# Patient Record
Sex: FEMALE | Race: WHITE | NOT HISPANIC OR LATINO | Employment: UNEMPLOYED | URBAN - METROPOLITAN AREA
[De-identification: names, ages, dates, MRNs, and addresses within clinical notes are randomized per-mention and may not be internally consistent; named-entity substitution may affect disease eponyms.]

---

## 2017-01-10 ENCOUNTER — THERAPY VISIT (OUTPATIENT)
Dept: PHYSICAL THERAPY | Facility: CLINIC | Age: 52
End: 2017-01-10
Payer: COMMERCIAL

## 2017-01-10 DIAGNOSIS — M53.3 SACROILIAC JOINT PAIN: ICD-10-CM

## 2017-01-10 DIAGNOSIS — M54.50 LOW BACK PAIN WITHOUT SCIATICA, UNSPECIFIED BACK PAIN LATERALITY, UNSPECIFIED CHRONICITY: Primary | ICD-10-CM

## 2017-01-10 PROCEDURE — 97161 PT EVAL LOW COMPLEX 20 MIN: CPT | Mod: GP | Performed by: PHYSICAL THERAPIST

## 2017-01-10 PROCEDURE — 97110 THERAPEUTIC EXERCISES: CPT | Mod: GP | Performed by: PHYSICAL THERAPIST

## 2017-01-10 PROCEDURE — 97112 NEUROMUSCULAR REEDUCATION: CPT | Mod: GP | Performed by: PHYSICAL THERAPIST

## 2017-01-10 NOTE — PROGRESS NOTES
Subjective:    Silvia Brewster is a 51 year old female with a sacroiliac condition.  Condition occurred with:  Insidious onset.  Condition occurred: for unknown reasons.  This is a new condition  Date of orders 1/10/17    Pain started a couple of months ago; earlier this Summer went for a walk with her dog and developed LBP. Tried PT at [facility] which exacerbated sx.    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 Prolonged sitting/standing/computer work/driving.    PMH: kidney stones, diverticulosis, . .    Patient reports pain:  SI joint left, SI joint right and lower lumbar spine.  Radiates to:  Gluteals right and gluteals left.  Pain is described as aching and is intermittent and reported as 6/10.   Pain is worse during the day.  Symptoms are exacerbated by sitting and relieved by activity/movement and NSAID's (walking, movement. NSAIDs helpful).  Since onset symptoms are gradually worsening.  Special testing: has had imaging; no results in records.      General health as reported by patient is good.                                            Objective:    Standing Alignment:        Lumbar:  Anterior pelvic tilt and lordosis decr                           Lumbar/SI Evaluation  ROM:    AROM Lumbar:   Flexion:          Mod loss  Ext:                    Major loss   Side Bend:        Left:  Inc R side pain    Right:  Inc R side pain  Rotation:           Left:     Right:   Side Glide:        Left:     Right:         Strength: 4+/5 B glute max; + active SLR for poor TrA strength            Lumbar Palpation:    Tenderness present at Left:    Piriformis  Tenderness not present at Left:    PSIS  Tenderness present at Right: Piriformis  Tenderness not present at Right:  PSIS    Lumbar Provocation:      Left negative with:  PROM hip    Right negative with:  PROM hip    SI joint/Sacrum:        Left positive at:    Sacral thrust  Left negative at:    Squish and Ilium  Ventromedial  Right positive at:    Thigh thrust  Right negative at:  Squish and Ilium Ventromedial                                                     Sixto Lumbar Evaluation    Posture:  Sitting: fair  Standing: fair  Lordosis: Reduced  Lateral Shift: no      Movement Loss:  Flexion (Flex): mod  Extension (EXT): major      Test Movements:  FIS: During: produces  After: no effect      EIS: During: produces  After: no effect    Repeat EIS: During: produces  After: better  Mechanical Response: IncROM    EIL: During: increases  After: better  Mechanical Response: IncROM  Repeat EIL: During: produces  After: better                                                   ROS    Assessment/Plan:      Patient is a 51 year old female with lumbar and sacral complaints.    Patient has the following significant findings with corresponding treatment plan.                Diagnosis 1:  Sacroiliac joint pain with sx consistent with lumbar spine derangement  Pain -  hot/cold therapy, manual therapy, self management, education, directional preference exercise and home program  Decreased ROM/flexibility - manual therapy, therapeutic exercise and home program  Decreased joint mobility - manual therapy, therapeutic exercise and home program  Decreased strength - therapeutic exercise, therapeutic activities and home program  Decreased proprioception - neuro re-education, therapeutic activities and home program  Impaired posture - neuro re-education, therapeutic activities and home program    Therapy Evaluation Codes:   1) History comprised of:   Personal factors that impact the plan of care:      Gender, Past/current experiences and Time since onset of symptoms.    Comorbidity factors that impact the plan of care are:      None.     Medications impacting care: None.  2) Examination of Body Systems comprised of:   Body structures and functions that impact the plan of care:      Lumbar spine and Sacral illiac joint.   Activity limitations  that impact the plan of care are:      Bathing, Bending, Driving, Lifting, Reading/Computer work, Sitting and Standing.  3) Clinical presentation characteristics are:   Evolving/Changing.  4) Decision-Making    Low complexity using standardized patient assessment instrument and/or measureable assessment of functional outcome.  Cumulative Therapy Evaluation is: Low complexity.    Previous and current functional limitations:  (See Goal Flow Sheet for this information)    Short term and Long term goals: (See Goal Flow Sheet for this information)     Communication ability:  Patient appears to be able to clearly communicate and understand verbal and written communication and follow directions correctly.  Treatment Explanation - The following has been discussed with the patient:   RX ordered/plan of care  Anticipated outcomes  Possible risks and side effects  This patient would benefit from PT intervention to resume normal activities.   Rehab potential is good.    Frequency:  2 X week, once daily  Duration:  for 2 weeks tapering to 1 X a week over 4 weeks  Discharge Plan:  Achieve all LTG.  Independent in home treatment program.  Return to previous functional level by discharge.  Reach maximal therapeutic benefit.    Please refer to the daily flowsheet for treatment today, total treatment time and time spent performing 1:1 timed codes.

## 2017-01-10 NOTE — LETTER
[facility] PHYSICAL THERAPY  [address] [address]  [phone]    2017    Re: Silvia Brewster   :   1965  MRN:  [id number]   REFERRING PHYSICIAN:   Kevyn Gonzalez    [facility] PHYSICAL [facility]    Date of Initial Evaluation:  01/10/2017  Visits: 1  Rxs Used: 1  Reason for Referral:     Low back pain without sciatica, unspecified back pain laterality, unspecified chronicity  Sacroiliac joint pain    EVALUATION SUMMARY    Subjective:   Silvia Brewster is a 51 year old female with a sacroiliac condition.  Condition occurred with:  Insidious onset.  Condition occurred: for unknown reasons.  This is a new condition  Date of orders 1/10/17  Pain started a couple of months ago; earlier this Summer went for a walk with her dog and developed LBP. Tried PT at [facility] which exacerbated sx.  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 Prolonged sitting/standing/computer work/driving.  PMH: kidney stones, diverticulosis, . .    Patient reports pain:  SI joint left, SI joint right and lower lumbar spine.  Radiates to:  Gluteals right and gluteals left.  Pain is described as aching and is intermittent and reported as 6/10.   Pain is worse during the day.  Symptoms are exacerbated by sitting and relieved by activity/movement and NSAID's (walking, movement. NSAIDs helpful).  Since onset symptoms are gradually worsening.  Special testing: has had imaging; no results in records.      General health as reported by patient is good.                  Objective:  Standing Alignment:    Lumbar:  Anterior pelvic tilt and lordosis decr   Lumbar/SI Evaluation  ROM:    AROM Lumbar:   Flexion:          Mod loss  Ext:                    Major loss   Side Bend:        Left:  Inc R side pain    Right:  Inc R side pain  Rotation:           Left:     Right:   Side Glide:         Left:     Right:      Strength: 4+/5 B glute max; + active SLR for poor TrA strength  Re: Silvia Brewster   :   1965    Lumbar Palpation:    Tenderness present at Left:    Piriformis  Tenderness not present at Left:    PSIS  Tenderness present at Right: Piriformis  Tenderness not present at Right:  PSIS  Lumbar Provocation:    Left negative with:  PROM hip  Right negative with:  PROM hip  SI joint/Sacrum:      Left positive at:    Sacral thrust  Left negative at:    Squish and Ilium Ventromedial  Right positive at:    Thigh thrust  Right negative at:  Squish and Ilium Ventromedial  Sixto Lumbar Evaluation  Posture:  Sitting: fair  Standing: fair  Lordosis: Reduced  Lateral Shift: no  Movement Loss:  Flexion (Flex): mod  Extension (EXT): major  Test Movements:  FIS: During: produces  After: no effect    EIS: During: produces  After: no effect    Repeat EIS: During: produces  After: better  Mechanical Response: IncROM  EIL: During: increases  After: better  Mechanical Response: IncROM  Repeat EIL: During: produces  After: better      Assessment/Plan:    Patient is a 51 year old female with lumbar and sacral complaints.    Patient has the following significant findings with corresponding treatment plan.                Diagnosis 1:  Sacroiliac joint pain with sx consistent with lumbar spine derangement  Pain -  hot/cold therapy, manual therapy, self management, education, directional preference exercise and home program  Decreased ROM/flexibility - manual therapy, therapeutic exercise and home program  Decreased joint mobility - manual therapy, therapeutic exercise and home program  Decreased strength - therapeutic exercise, therapeutic activities and home program  Decreased proprioception - neuro re-education, therapeutic activities and home program  Impaired posture - neuro re-education, therapeutic activities and home program    Therapy Evaluation Codes:   1) History comprised of:   Personal factors that  impact the plan of care:      Gender, Past/current experiences and Time since onset of symptoms.    Comorbidity factors that impact the plan of care are:      None.     Medications impacting care: None.  Re: Silvia Brewster   :   1965    2) Examination of Body Systems comprised of:   Body structures and functions that impact the plan of care:      Lumbar spine and Sacral illiac joint.   Activity limitations that impact the plan of care are:      Bathing, Bending, Driving, Lifting, Reading/Computer work, Sitting and Standing.  3) Clinical presentation characteristics are:   Evolving/Changing.  4) Decision-Making    Low complexity using standardized patient assessment instrument and/or measureable assessment of functional outcome.  Cumulative Therapy Evaluation is: Low complexity.  Previous and current functional limitations:  (See Goal Flow Sheet for this information)    Short term and Long term goals: (See Goal Flow Sheet for this information)   Communication ability:  Patient appears to be able to clearly communicate and understand verbal and written communication and follow directions correctly.  Treatment Explanation - The following has been discussed with the patient:   RX ordered/plan of care  Anticipated outcomes  Possible risks and side effects  This patient would benefit from PT intervention to resume normal activities.   Rehab potential is good.  Frequency:  2 X week, once daily  Duration:  for 2 weeks tapering to 1 X a week over 4 weeks  Discharge Plan:  Achieve all LTG.  Independent in home treatment program.  Return to previous functional level by discharge.  Reach maximal therapeutic benefit.                  Thank you for your referral.    INQUIRIES  Therapist: Tammy Rodriguez, PT  [facility] PHYSICAL THERAPY  [address] [place] [id number]  Phone: [phone]  Fax: [phone]

## 2017-01-18 ENCOUNTER — THERAPY VISIT (OUTPATIENT)
Dept: PHYSICAL THERAPY | Facility: CLINIC | Age: 52
End: 2017-01-18
Payer: COMMERCIAL

## 2017-01-18 DIAGNOSIS — M54.50 LOW BACK PAIN WITHOUT SCIATICA, UNSPECIFIED BACK PAIN LATERALITY, UNSPECIFIED CHRONICITY: ICD-10-CM

## 2017-01-18 DIAGNOSIS — M53.3 SACROILIAC JOINT PAIN: Primary | ICD-10-CM

## 2017-01-18 PROCEDURE — 97140 MANUAL THERAPY 1/> REGIONS: CPT | Mod: GP | Performed by: PHYSICAL THERAPIST

## 2017-01-18 PROCEDURE — 97110 THERAPEUTIC EXERCISES: CPT | Mod: GP | Performed by: PHYSICAL THERAPIST

## 2017-01-18 PROCEDURE — 97112 NEUROMUSCULAR REEDUCATION: CPT | Mod: GP | Performed by: PHYSICAL THERAPIST

## 2017-01-20 ENCOUNTER — THERAPY VISIT (OUTPATIENT)
Dept: PHYSICAL THERAPY | Facility: CLINIC | Age: 52
End: 2017-01-20
Payer: COMMERCIAL

## 2017-01-20 DIAGNOSIS — M53.3 SACROILIAC JOINT PAIN: Primary | ICD-10-CM

## 2017-01-20 DIAGNOSIS — M54.50 LOW BACK PAIN WITHOUT SCIATICA, UNSPECIFIED BACK PAIN LATERALITY, UNSPECIFIED CHRONICITY: ICD-10-CM

## 2017-01-20 PROCEDURE — 97110 THERAPEUTIC EXERCISES: CPT | Mod: GP | Performed by: PHYSICAL THERAPIST

## 2017-01-20 PROCEDURE — 97112 NEUROMUSCULAR REEDUCATION: CPT | Mod: GP | Performed by: PHYSICAL THERAPIST

## 2017-01-20 PROCEDURE — 97530 THERAPEUTIC ACTIVITIES: CPT | Mod: GP | Performed by: PHYSICAL THERAPIST

## 2017-02-06 ENCOUNTER — THERAPY VISIT (OUTPATIENT)
Dept: PHYSICAL THERAPY | Facility: CLINIC | Age: 52
End: 2017-02-06
Payer: COMMERCIAL

## 2017-02-06 DIAGNOSIS — M53.3 SACROILIAC JOINT PAIN: Primary | ICD-10-CM

## 2017-02-06 DIAGNOSIS — M54.50 LOW BACK PAIN WITHOUT SCIATICA, UNSPECIFIED BACK PAIN LATERALITY, UNSPECIFIED CHRONICITY: ICD-10-CM

## 2017-02-06 PROCEDURE — 97112 NEUROMUSCULAR REEDUCATION: CPT | Mod: GP

## 2017-02-06 PROCEDURE — 97110 THERAPEUTIC EXERCISES: CPT | Mod: GP

## 2017-02-13 ENCOUNTER — THERAPY VISIT (OUTPATIENT)
Dept: PHYSICAL THERAPY | Facility: CLINIC | Age: 52
End: 2017-02-13
Payer: COMMERCIAL

## 2017-02-13 DIAGNOSIS — M54.50 LOW BACK PAIN WITHOUT SCIATICA, UNSPECIFIED BACK PAIN LATERALITY, UNSPECIFIED CHRONICITY: ICD-10-CM

## 2017-02-13 DIAGNOSIS — M53.3 SACROILIAC JOINT PAIN: ICD-10-CM

## 2017-02-13 PROCEDURE — 97112 NEUROMUSCULAR REEDUCATION: CPT | Mod: GP

## 2017-02-13 PROCEDURE — 97110 THERAPEUTIC EXERCISES: CPT | Mod: GP

## 2017-02-23 ENCOUNTER — THERAPY VISIT (OUTPATIENT)
Dept: PHYSICAL THERAPY | Facility: CLINIC | Age: 52
End: 2017-02-23
Payer: COMMERCIAL

## 2017-02-23 DIAGNOSIS — M53.3 SACROILIAC JOINT PAIN: ICD-10-CM

## 2017-02-23 DIAGNOSIS — M54.50 LOW BACK PAIN WITHOUT SCIATICA, UNSPECIFIED BACK PAIN LATERALITY, UNSPECIFIED CHRONICITY: ICD-10-CM

## 2017-02-23 PROCEDURE — 97110 THERAPEUTIC EXERCISES: CPT | Mod: GP

## 2017-02-23 PROCEDURE — 97112 NEUROMUSCULAR REEDUCATION: CPT | Mod: GP

## 2017-12-18 ENCOUNTER — OFFICE VISIT (OUTPATIENT)
Dept: URGENT CARE | Facility: URGENT CARE | Age: 52
End: 2017-12-18
Payer: COMMERCIAL

## 2017-12-18 VITALS
SYSTOLIC BLOOD PRESSURE: 114 MMHG | BODY MASS INDEX: 24.68 KG/M2 | DIASTOLIC BLOOD PRESSURE: 64 MMHG | OXYGEN SATURATION: 97 % | WEIGHT: 182 LBS | HEART RATE: 68 BPM | TEMPERATURE: 98.5 F

## 2017-12-18 DIAGNOSIS — R07.0 THROAT PAIN: Primary | ICD-10-CM

## 2017-12-18 LAB
DEPRECATED S PYO AG THROAT QL EIA: NORMAL
SPECIMEN SOURCE: NORMAL

## 2017-12-18 PROCEDURE — 99213 OFFICE O/P EST LOW 20 MIN: CPT | Performed by: INTERNAL MEDICINE

## 2017-12-18 PROCEDURE — 87081 CULTURE SCREEN ONLY: CPT | Performed by: INTERNAL MEDICINE

## 2017-12-18 PROCEDURE — 87880 STREP A ASSAY W/OPTIC: CPT | Performed by: INTERNAL MEDICINE

## 2017-12-18 ASSESSMENT — ENCOUNTER SYMPTOMS: FEVER: 0

## 2017-12-18 NOTE — MR AVS SNAPSHOT
"              After Visit Summary   2017    Silvia Brewster    MRN: [id number]           Patient Information     Date Of Birth          1965        Visit Information        Provider Department      2017 6:40 PM Camilla Carnes MD [facility] Urgent Care        Today's Diagnoses     Throat pain    -  1       Follow-ups after your visit        Who to contact     If you have questions or need follow up information about today's clinic visit or your schedule please contact [facility] URGENT CARE directly at [phone].  Normal or non-critical lab and imaging results will be communicated to you by [employer], letter or phone within 4 business days after the clinic has received the results. If you do not hear from us within 7 days, please contact the clinic through [employer] or phone. If you have a critical or abnormal lab result, we will notify you by phone as soon as possible.  Submit refill requests through [employer] or call your pharmacy and they will forward the refill request to us. Please allow 3 business days for your refill to be completed.          Additional Information About Your Visit        MyChart Information     [employer] lets you send messages to your doctor, view your test results, renew your prescriptions, schedule appointments and more. To sign up, go to [email or web address] . Click on \"Log in\" on the left side of the screen, which will take you to the Welcome page. Then click on \"Sign up Now\" on the right side of the page.     You will be asked to enter the access code listed below, as well as some personal information. Please follow the directions to create your username and password.     Your access code is: [email or web address]  Expires: 3/18/2018  8:20 PM     Your access code will  in 90 days. If you need help or a new code, please call your [facility] or [phone].        Care EveryWhere ID     This is your Care EveryWhere ID. This could be used by " other organizations to access your [facility] medical records  [id number]        Your Vitals Were     Pulse Temperature Last Period Pulse Oximetry BMI (Body Mass Index)       68 98.5  F (36.9  C) (Tympanic) 12/27/2015 97% 24.68 kg/m2        Blood Pressure from Last 3 Encounters:   12/18/17 114/64   10/08/16 123/67   01/10/16 114/60    Weight from Last 3 Encounters:   12/18/17 182 lb (82.6 kg)   10/08/16 175 lb (79.4 kg)   01/10/16 165 lb (74.8 kg)              We Performed the Following     Beta strep group A culture     Strep, Rapid Screen        Primary Care Provider Office Phone # Fax #    Patsy St [phone]       [facility] [address] [place] [id number]        Equal Access to Services     [facility] Hospital[facility] : Hadii kavon bethea hadasho Socruz, waaxda luqadaha, qaybta kaalmada adeegyada, marva floyd haymarguerite gomez . So [facility] [phone].    ATENCIÓN: Si habla español, tiene a acevedo disposición servicios gratuitos de asistencia lingüística. Llame al [phone].    We comply with applicable federal civil rights laws and [place] laws. We do not discriminate on the basis of race, color, national origin, age, disability, sex, sexual orientation, or gender identity.            Thank you!     Thank you for choosing [facility] URGENT CARE  for your care. Our goal is always to provide you with excellent care. Hearing back from our patients is one way we can continue to improve our services. Please take a few minutes to complete the written survey that you may receive in the mail after your visit with us. Thank you!             Your Updated Medication List - Protect others around you: Learn how to safely use, store and throw away your medicines at www.disposemymeds.org.          This list is accurate as of: 12/18/17  8:20 PM.  Always use your most recent med list.                   Brand Name Dispense Instructions for use Diagnosis     azithromycin 250 MG tablet    ZITHROMAX    6 tablet    Two tablets first day, then one tablet daily for four days.    Acute bronchitis with symptoms > 10 days       GNP VITAMIN B COMPLEX PO           LUNG Tabs      Take 1 tablet by mouth daily as needed.        Multi-vitamin Tabs tablet   Generic drug:  multivitamin, therapeutic with minerals      Take 1 tablet by mouth daily.        VITAMIN D (CHOLECALCIFEROL) PO      Take by mouth daily

## 2017-12-19 NOTE — PROGRESS NOTES
SUBJECTIVE:   Silvia Brewster is a 52 year old female presenting with a chief complaint of   Chief Complaint   Patient presents with     Urgent Care     Pt in clinic to have eval for sore throat due to exposure.     Pharyngitis   .    Onset of symptoms was today    Current and Associated symptoms: sore throat and headache  Treatment measures tried include None tried.  Predisposing factors include ill contact: Family member  and exposure to strep.  [place]    Review of Systems   Constitutional: Negative for fever.   Respiratory:        Ongoing cough         Past Medical History:   Diagnosis Date     Bicuspid aortic valve      HELLP syndrome      Migraine headache      Current Outpatient Prescriptions   Medication Sig Dispense Refill     B Complex Vitamins (GNP VITAMIN B COMPLEX PO)        VITAMIN D, CHOLECALCIFEROL, PO Take by mouth daily       Multiple Vitamin (MULTI-VITAMIN) per tablet Take 1 tablet by mouth daily.       azithromycin (ZITHROMAX) 250 MG tablet Two tablets first day, then one tablet daily for four days. (Patient not taking: Reported on 12/18/2017) 6 tablet 0     Specialty Vitamins Products (LUNG) TABS Take 1 tablet by mouth daily as needed.       Social History   Substance Use Topics     Smoking status: Former Smoker     Quit date: 11/21/1990     Smokeless tobacco: Never Used     Alcohol use Not on file       OBJECTIVE  /64  Pulse 68  Temp 98.5  F (36.9  C) (Tympanic)  Wt 182 lb (82.6 kg)  LMP 12/27/2015  SpO2 97%  BMI 24.68 kg/m2    Physical Exam   Constitutional: She is well-developed, well-nourished, and in no distress.   HENT:   Nose: Nose normal.   Mouth/Throat: Oropharynx is clear and moist. No oropharyngeal exudate.   tympanic membrane bilateral clear   Cardiovascular: Normal rate, regular rhythm and normal heart sounds.    Pulmonary/Chest: Effort normal and breath sounds normal.   Lymphadenopathy:     She has no cervical adenopathy.       Labs:  Results for orders  placed or performed in visit on 12/18/17 (from the past 24 hour(s))   Strep, Rapid Screen   Result Value Ref Range    Specimen Description Throat     Rapid Strep A Screen       NEGATIVE: No Group A streptococcal antigen detected by immunoassay, await culture report.     ASSESSMENT:      ICD-10-CM    1. Throat pain R07.0 Strep, Rapid Screen     Beta strep group A culture        Medical Decision Making:    Differential Diagnosis:  Strep pharyngitis    Serious Comorbid Conditions:  None    PLAN:  Fluids and Rest    Followup:  Call or return to clinic if symptoms worsen or fail to improve as anticipated.

## 2017-12-19 NOTE — NURSING NOTE
Chief Complaint   Patient presents with     Urgent Care     Pt in clinic to have eval for sore throat due to exposure.     Pharyngitis       Initial /64  Pulse 68  Temp 98.5  F (36.9  C) (Tympanic)  Wt 82.6 kg (182 lb)  LMP 12/27/2015  SpO2 97%  BMI 24.68 kg/m2 Estimated body mass index is 24.68 kg/(m^2) as calculated from the following:    Height as of 1/10/16: 1.829 m (6').    Weight as of this encounter: 82.6 kg (182 lb).  Medication Reconciliation: complete   Mariah Veras/ MA

## 2017-12-20 LAB
BACTERIA SPEC CULT: NORMAL
SPECIMEN SOURCE: NORMAL

## 2017-12-26 ENCOUNTER — TRANSFERRED RECORDS (OUTPATIENT)
Dept: HEALTH INFORMATION MANAGEMENT | Facility: CLINIC | Age: 52
End: 2017-12-26

## 2017-12-26 ENCOUNTER — MEDICAL CORRESPONDENCE (OUTPATIENT)
Dept: HEALTH INFORMATION MANAGEMENT | Facility: CLINIC | Age: 52
End: 2017-12-26

## 2018-01-10 ENCOUNTER — OFFICE VISIT (OUTPATIENT)
Dept: ORTHOPEDICS | Facility: CLINIC | Age: 53
End: 2018-01-10

## 2018-01-10 VITALS
HEIGHT: 72 IN | SYSTOLIC BLOOD PRESSURE: 118 MMHG | BODY MASS INDEX: 24.38 KG/M2 | DIASTOLIC BLOOD PRESSURE: 54 MMHG | WEIGHT: 180 LBS

## 2018-01-10 DIAGNOSIS — M79.671 RIGHT FOOT PAIN: Primary | ICD-10-CM

## 2018-01-10 NOTE — PROGRESS NOTES
"      SPORTS & ORTHOPEDIC WALK-IN VISIT 1/10/2018    Primary Care Physician: Dr. Patsy St    Silvia was walking fast in her house when she \"stubbed\"  her pinky toe on the furniture. She ended up going in to have it checked out while she was in [place], [place] over the holidays about a week after the initial injury. The put her in a post op shoe for questionable fx at the 5th MT head.  Referred to ortho back home for repeat XR and examination.     Reason for visit:     What part of your body is injured / painful?  right foot    What caused the injury /pain? Stubbed toe on furniture    How long ago did your injury occur or pain begin? 12/20/17    What are your most bothersome symptoms? Pain    How would you characterize your symptom?  aching, dull and sharp    What makes your symptoms better? Rest and Ibuprofen    What makes your symptoms worse? Walking    Have you been previously seen for this problem? Yes, [facility] in [place]. XR and post op shoe    Medical History:    Any recent changes to your medical history? No    Any new medication prescribed since last visit? No  Have you had surgery on this body part before? No    Social History:    Occupation: None    Handedness: Right    Exercise: None    Review of Systems:    Do you have fever, chills, weight loss? No    Do you have any vision problems? No    Do you have any chest pain or edema? No    Do you have any shortness of breath or wheezing?  No    Do you have stomach problems? Acid Reflux    Do you have any numbness or focal weakness? No    Do you have diabetes? No    Do you have problems with bleeding or clotting? No    Do you have an rashes or other skin lesions? No           "

## 2018-01-10 NOTE — PROGRESS NOTES
[facility] Sports and Orthopedic Walk-in Clinic Note      Patient is a 52 year old female who presents to the office today for: follow up questionable metatarsal fracture of right foot.   Injured week before chantel. Jammed toes. Pain swelling, bruising over 5th MT head.  Seen in [place] while traveling on 12/26/17 and dx with possible 5th mt fx. Given boot. Swelling, bruising have resolved. Pain improved but still has more pain than she had thought she would. Seen at [facility] yesterday and xrays repeated, no fracture. Would like further explanation about what she should be doing to support the foot and what activity level she can have currently.     Past Medical History, Current Medications, and Allergies are reviewed in the electronic medical record as appropriate.     ROS: Pertinent items are noted in HPI.  Constitutional: negative for fevers, chills and malaise  Cardiovascular: negative for dyspnea, fatigue, lower extremity edema  Integument/breast: negative for rash, skin lesion(s) and skin color change  Neurological: negative for paresthesia and weakness      EXAM:/54  Ht 6' (1.829 m)  Wt 180 lb (81.6 kg)  LMP 12/27/2015  BMI 24.41 kg/m2    General: Alert, pleasant, no distress  Right foot: Warm and well-perfused.  No edema, ecchymosis, erythema.  She has full range of motion at the toes and ankle without significant pain.  She does have some tenderness to palpation distally over the fourth metatarsal and between the fourth and fifth toes.  She is able to flex and extend at the MTP joints against resistance without significant pain.  Does have some pain with walking particularly with toeing off.  Sensation is intact light touch throughout.    Radiographs: Reviewed initial radiographs from 12/26/2017 which do not appear to demonstrate any fracture.  X-ray report from [facility] dated 1/9/2018 did  report any fracture or osseous abnormality.      Assessment: Patient is a 52 year old female with pain in the  right fourth and fifth metatarsals.  At this point, three weeks out from injury, an occult fracture would be more likely evident on imaging. overall sounds to be healing.  Could possibly have been joint sprain resulting in her persistent pain.    Recommendations:   Boot or post op shoe for comfort. Activity as tolerated, pain as her guide.   Would expect she should have improved pain in normal footwear within 3 weeks.     Néstor Brizuela MD

## 2018-01-10 NOTE — MR AVS SNAPSHOT
After Visit Summary   1/10/2018    Silvia Brewster    MRN: [id number]           Patient Information     Date Of Birth          1965        Visit Information        Provider Department      1/10/2018 12:10 PM Néstor Brizuela MD [facility]        Today's Diagnoses     Right foot pain    -  1       Follow-ups after your visit        Who to contact     Please call your clinic at [phone] to:    Ask questions about your health    Make or cancel appointments    Discuss your medicines    Learn about your test results    Speak to your doctor   If you have compliments or concerns about an experience at your clinic, or if you wish to file a complaint, please contact [facility] Patient Relations at [phone] or email us at [email or web address]         Additional Information About Your Visit        MyChart Information     [employer] is an electronic gateway that provides easy, online access to your medical records. With [employer], you can request a clinic appointment, read your test results, renew a prescription or communicate with your care team.     To sign up for [email or web address] visit the website at [email or web address]   You will be asked to enter the access code listed below, as well as some personal information. Please follow the directions to create your username and password.     Your access code is: [email or web address]  Expires: 3/18/2018  8:20 PM     Your access code will  in 90 days. If you need help or a new code, please contact your [facility] or call [phone] for assistance.        Care EveryWhere ID     This is your Care EveryWhere ID. This could be used by other organizations to access your [place] medical records  [id number]        Your Vitals Were     Height Last Period BMI (Body Mass Index)             6' (1.829 m) 2015 24.41 kg/m2          Blood Pressure from Last 3  Encounters:   01/10/18 118/54   12/18/17 114/64   10/08/16 123/67    Weight from Last 3 Encounters:   01/10/18 180 lb (81.6 kg)   12/18/17 182 lb (82.6 kg)   10/08/16 175 lb (79.4 kg)              Today, you had the following     No orders found for display       Primary Care Provider Office Phone # Fax #    [address] [phone]       [facility] [address]        Equal Access to Services     [facility]: Hadii aad ku hadasho Soomaali, waaxda luqadaha, qaybta kaalmada adeaaronyafranklin, marva gomez . So [facility] [phone].    ATENCIÓN: Si habla español, tiene a acevedo disposición servicios gratuitos de asistencia lingüística. [facility] [phone].    We comply with applicable federal civil rights laws and [place] laws. We do not discriminate on the basis of race, color, national origin, age, disability, sex, sexual orientation, or gender identity.            Thank you!     Thank you for choosing [facility]  for your care. Our goal is always to provide you with excellent care. Hearing back from our patients is one way we can continue to improve our services. Please take a few minutes to complete the written survey that you may receive in the mail after your visit with us. Thank you!             Your Updated Medication List - Protect others around you: Learn how to safely use, store and throw away your medicines at www.disposemymeds.org.          This list is accurate as of: 1/10/18 11:59 PM.  Always use your most recent med list.                   Brand Name Dispense Instructions for use Diagnosis    azithromycin 250 MG tablet    ZITHROMAX    6 tablet    Two tablets first day, then one tablet daily for four days.    Acute bronchitis with symptoms > 10 days       GNP VITAMIN B COMPLEX PO           LUNG Tabs      Take 1 tablet by mouth daily as needed.        Multi-vitamin Tabs  tablet   Generic drug:  multivitamin, therapeutic with minerals      Take 1 tablet by mouth daily.        VITAMIN D (CHOLECALCIFEROL) PO      Take by mouth daily

## 2020-02-20 ENCOUNTER — OFFICE VISIT (OUTPATIENT)
Dept: FAMILY MEDICINE | Facility: CLINIC | Age: 55
End: 2020-02-20
Payer: COMMERCIAL

## 2020-02-20 VITALS
BODY MASS INDEX: 22.63 KG/M2 | SYSTOLIC BLOOD PRESSURE: 132 MMHG | HEART RATE: 80 BPM | WEIGHT: 167.1 LBS | OXYGEN SATURATION: 96 % | HEIGHT: 72 IN | DIASTOLIC BLOOD PRESSURE: 82 MMHG | TEMPERATURE: 97.7 F

## 2020-02-20 DIAGNOSIS — J18.9 PNEUMONIA OF LEFT LOWER LOBE DUE TO INFECTIOUS ORGANISM: Primary | ICD-10-CM

## 2020-02-20 PROCEDURE — 99213 OFFICE O/P EST LOW 20 MIN: CPT | Performed by: NURSE PRACTITIONER

## 2020-02-20 RX ORDER — AZITHROMYCIN 250 MG/1
TABLET, FILM COATED ORAL
Qty: 6 TABLET | Refills: 0 | Status: SHIPPED | OUTPATIENT
Start: 2020-02-20 | End: 2020-02-25

## 2020-02-20 ASSESSMENT — MIFFLIN-ST. JEOR: SCORE: 1476.96

## 2020-02-20 NOTE — PROGRESS NOTES
Subjective     Silvia Brewster is a 54 year old female who presents to clinic today for the following health issues:    HPI   Acute Illness   Acute illness concerns: pneumonia   Onset: about 10 days now    Fever: no    Chills/Sweats: YES- hot flashes so not sure     Headache (location?): YES    Sinus Pressure:YES    Conjunctivitis:  no    Ear Pain: YES- possibly     Rhinorrhea: YES, the last 2-3 days     Congestion: YES    Sore Throat: YES     Cough: YES    Wheeze: YES-    Decreased Appetite: no    Nausea: no    Vomiting: no    Diarrhea:  no    Dysuria/Freq.: no    Fatigue/Achiness: YES    Sick/Strep Exposure: YES     Therapies Tried and outcome: Raw Garlic and ginger. Two of her family members have recently been diagnosed with pneumonia and are receiving treatment    -------------------------------------    Patient Active Problem List   Diagnosis     Pain of right thigh     Sacroiliac joint pain     Low back pain without sciatica, unspecified back pain laterality, unspecified chronicity     Past Surgical History:   Procedure Laterality Date     C/SECTION, LOW TRANSVERSE         Social History     Tobacco Use     Smoking status: Former Smoker     Last attempt to quit: 1990     Years since quittin.2     Smokeless tobacco: Never Used   Substance Use Topics     Alcohol use: Not on file     Family History   Problem Relation Age of Onset     Breast Cancer Mother      Prostate Cancer Father            -------------------------------------  Reviewed and updated as needed this visit by Provider         Review of Systems   ROS COMP: CONSTITUTIONAL:POSITIVE  for fatigue, malaise and myalgias  RESP:POSITIVE for cough-productive and dyspnea on exertion  CV: NEGATIVE for chest pain, palpitations or peripheral edema      Objective    LMP 2015   There is no height or weight on file to calculate BMI.  Physical Exam   GENERAL: healthy, alert and no distress  EYES: Eyes grossly normal to inspection, PERRL and  conjunctivae and sclerae normal  HENT: ear canals and TM's normal, nose and mouth without ulcers or lesions  NECK: no adenopathy, no asymmetry, masses, or scars and thyroid normal to palpation  RESP: decreased breath sounds left lower lobe, posterior  CV: regular rate and rhythm, normal S1 S2, no S3 or S4, no murmur, click or rub, no peripheral edema and peripheral pulses strong  ABDOMEN: soft, nontender, no hepatosplenomegaly, no masses and bowel sounds normal  MS: no gross musculoskeletal defects noted, no edema    Diagnostic Test Results:  Labs reviewed in Epic  No results found for this or any previous visit (from the past 24 hour(s)).        Assessment & Plan   Problem List Items Addressed This Visit     None      Visit Diagnoses     Pneumonia of left lower lobe due to infectious organism (H)    -  Primary    Relevant Medications    azithromycin 250 MG PO tablet         Follow up if not improving or worsening      See Patient Instructions  No follow-ups on file.    ZANDER Jones [facility]

## 2020-02-23 ENCOUNTER — HEALTH MAINTENANCE LETTER (OUTPATIENT)
Age: 55
End: 2020-02-23

## 2020-03-11 ENCOUNTER — HOSPITAL ENCOUNTER (EMERGENCY)
Facility: CLINIC | Age: 55
Discharge: HOME | End: 2020-03-11
Attending: EMERGENCY MEDICINE | Admitting: EMERGENCY MEDICINE
Payer: COMMERCIAL

## 2020-03-11 ENCOUNTER — NURSE TRIAGE (OUTPATIENT)
Dept: NURSING | Facility: CLINIC | Age: 55
End: 2020-03-11

## 2020-03-11 ENCOUNTER — VIRTUAL VISIT (OUTPATIENT)
Dept: FAMILY MEDICINE | Facility: OTHER | Age: 55
End: 2020-03-11

## 2020-03-11 ENCOUNTER — APPOINTMENT (OUTPATIENT)
Dept: GENERAL RADIOLOGY | Facility: CLINIC | Age: 55
End: 2020-03-11
Attending: EMERGENCY MEDICINE
Payer: COMMERCIAL

## 2020-03-11 VITALS
OXYGEN SATURATION: 100 % | DIASTOLIC BLOOD PRESSURE: 70 MMHG | HEART RATE: 62 BPM | SYSTOLIC BLOOD PRESSURE: 123 MMHG | RESPIRATION RATE: 16 BRPM | TEMPERATURE: 98.5 F

## 2020-03-11 DIAGNOSIS — R05.9 COUGH: ICD-10-CM

## 2020-03-11 LAB
FLUAV+FLUBV AG SPEC QL: NEGATIVE
FLUAV+FLUBV AG SPEC QL: NEGATIVE
SPECIMEN SOURCE: NORMAL

## 2020-03-11 PROCEDURE — 99283 EMERGENCY DEPT VISIT LOW MDM: CPT | Performed by: EMERGENCY MEDICINE

## 2020-03-11 PROCEDURE — 87804 INFLUENZA ASSAY W/OPTIC: CPT | Performed by: EMERGENCY MEDICINE

## 2020-03-11 PROCEDURE — 99282 EMERGENCY DEPT VISIT SF MDM: CPT | Mod: Z6 | Performed by: EMERGENCY MEDICINE

## 2020-03-11 PROCEDURE — 87804 INFLUENZA ASSAY W/OPTIC: CPT | Mod: 59 | Performed by: EMERGENCY MEDICINE

## 2020-03-11 PROCEDURE — 71045 X-RAY EXAM CHEST 1 VIEW: CPT

## 2020-03-11 RX ORDER — NICOTINE 14MG/24HR
PATCH, TRANSDERMAL 24 HOURS TRANSDERMAL
COMMUNITY

## 2020-03-11 RX ORDER — OMEGA-3 FATTY ACIDS/FISH OIL 300-1000MG
400 CAPSULE ORAL EVERY 4 HOURS PRN
COMMUNITY

## 2020-03-11 SDOH — HEALTH STABILITY: MENTAL HEALTH: HOW OFTEN DO YOU HAVE A DRINK CONTAINING ALCOHOL?: NEVER

## 2020-03-11 ASSESSMENT — ENCOUNTER SYMPTOMS
FEVER: 0
COUGH: 1
FLU SYMPTOMS: 1
MYALGIAS: 0
SORE THROAT: 0
HEADACHES: 0

## 2020-03-11 NOTE — PROGRESS NOTES
"Date: 2020 14:44:36  Clinician: Paolo Haas  Clinician NPI: [id number]  Patient: Silvia Brewster  Patient : 1965  Patient Address: [address], [place] [address]  Patient Phone: [phone]  Visit Protocol: URI  Patient Summary:  Silvia is a 54 year old ( : 1965 ) female who initiated a Visit for COVID-19 (Coronavirus) evaluation and screening. When asked the question \"Please sign me up to receive news, health information and promotions. \", Silvia responded \"Yes\".    Silvia states her symptoms started gradually 2-3 weeks ago. After her symptoms started, they improved and then got worse again.   Her symptoms consist of wheezing, malaise, a sore throat, a cough, and a headache.   Symptom details     Cough: Silvia coughs a few times an hour and her cough is not more bothersome at night. Phlegm comes into her throat when she coughs. She does not believe her cough is caused by post-nasal drip. The color of the phlegm is white.     Sore throat: Silvia reports having moderate throat pain (4-6 on a 10 point pain scale), does not have exudate on her tonsils, and can swallow liquids. She is not sure if the lymph nodes in her neck are enlarged. A rash has not appeared on the skin since the sore throat started.     Wheezing: Silvia has not ever been diagnosed with asthma. The wheezing does not interfere with her normal daily activities.    Headache: She states the headache is moderate (4-6 on a 10 point pain scale).      Silvia denies having rhinitis, ear pain, nasal congestion, fever, teeth pain, chills, facial pain or pressure, and myalgias. She also denies having recent facial or sinus surgery in the past 60 days.   Precipitating events  Within the past week, Silvia has not been exposed to someone with strep throat. She has not recently been exposed to someone with influenza. Silvia has not been in close contact with any high risk individuals.   Pertinent COVID-19 (Coronavirus) information  Silvia has " not traveled internationally in the last 14 days before the start of her symptoms.   Silvia has not had close contact with a laboratory confirmed positive COVID-19 patient within 14 days of symptom onset.   Triage Point(s) temporarily suspended for COVID-19 (Coronavirus) screening  Silvia reported the following symptoms which were previously protocol referral points. These protocol referral points have temporarily been removed for purposes of COVID-19 (Coronavirus) screening.   Difficulty breathing even when resting and can only speak in phrase(s)   Pertinent medical history  Silvia had 1 sinus infection within the past year.   Silvia has taken an antibiotic medication in the past month. Antibiotic details as reported by the patient (free text): Azithromycin 250mg 5 days, diagnosed with pneumonia. Started to get better. Then got worse again. The reason I used this system is that my daughter came home sick from [place] at end of January. Three of her friends got sick. Her dad and I both got sick and were diagnosed with pneumonia.   Silvia does not get yeast infections when she takes antibiotics.   Silvia does not need a return to work/school note.   Weight: 165 lbs   Silvia does not smoke or use smokeless tobacco.   Weight: 165 lbs    MEDICATIONS: Adult Probiotic oral, cholecalciferol (vitamin D3) oral, Advil oral, ALLERGIES: Penicillins  Clinician Response:  Dear Silvia,  I am sorry you are not feeling well. To determine the most appropriate care for you, I would like you to be seen in person to further discuss your health history and symptoms.  You will not be charged for this Visit. Thank you for trusting us with your care.   Diagnosis: Refer for additional evaluation  Diagnosis ICD: R69

## 2020-03-11 NOTE — ED AVS SNAPSHOT
[facility], [place], Emergency Department  [address]  [place] [address]  Phone:  [phone]  Fax:  [phone]                                    Silvia Brewster   MRN: [id number]    Department:  [facility], Emergency Department   Date of Visit:  3/11/2020           After Visit Summary Signature Page    I have received my discharge instructions, and my questions have been answered. I have discussed any challenges I see with this plan with the nurse or doctor.    ..........................................................................................................................................  Patient/Patient Representative Signature      ..........................................................................................................................................  Patient Representative Print Name and Relationship to Patient    ..................................................               ................................................  Date                                   Time    ..........................................................................................................................................  Reviewed by Signature/Title    ...................................................              ..............................................  Date                                               Time          22EPIC Rev 08/18

## 2020-03-12 NOTE — DISCHARGE INSTRUCTIONS
TODAY'S VISIT  YOU ARE BEING TESTED FOR COVID-19 (NOVEL CORONAVIRUS).   -  The cause of your symptoms is not yet known, but you are being tested for COVID-19.  -  It has been determined that you do not medically need to be hospitalized at this time, and  you can be monitored with self-isolation at home.     YOUR TESTS FOR THIS ILLNESS ARE CURRENTLY IN PROCESS.    -  These tests are performed by the [place] Department of Health.  -  Our staff will contact you with your results, likely within the next 24-72 hours.  -  Please remain under home isolation precautions until your healthcare provider and/or state and local health department tell you it is safe to longer self-isolate.    SELF-ISOLATION INSTRUCTIONS  STAY HOME. Do not go to work, school, or public areas. Avoid using public transportation, ride-sharing (Uber/Lyft), or taxis. You should only leave your home if you require medical attention (See instructions below, please contact your provider first.)   SEPARATE YOURSELF FROM OTHER PEOPLE AND ANIMALS. As much as possible, you should stay in a specific room and away from other people in your home. You should use a separate bathroom, if available. Avoid contact with pets and other animals. When possible, have another household member care for your  family and animals while you are sick.   DO NOT SHARE HOUSEHOLD ITEMS. Do not share dishes, drinking glasses, eating utensils, towels, bedding, etc., with others or pets in your home. These items should be washed with soap and water.   WEAR A FACEMASK. Wear a facemask if you need to be around other people, and cover your mouth and nose with tissue when you cough or sneeze.  WASH YOUR HANDS OFTEN. Wash your hands with soap and water for at least 20 second, or use hand  regularly. Avoid touching your face with unwashed hands.     SELF-MONITORING INSTRUCTIONS  SEEK PROMPT MEDICAL ATTENTION IF YOUR ILLNESS IS WORSENING. Watch closely for new or worsening  symptoms, such as shortness of breath or difficulty breathing.   SIGN UP FOR [employer]. Sign up for the [employer]'s Gift It application, using the information at the end of this document. Your results and a message about those results can be sent through CompareMyFare. If you do not have Girly Stuffhart, we will call you with your results, but it may take longer.  IF YOU NEED MEDICAL CARE OR HAVE A MEDICAL APPOINTMENT (and it is not an emergency): -  CALL YOUR HEALTHCARE PROVIDER BEFORE GOING TO THE [facility]  -  TELL THEM THAT YOU ARE BEING TESTING FOR AND MAY HAVE COVID-19.  YOUR CLOSE CONTACTS SHOULD MONITOR THEIR HEALTH. If they develop symptoms, they should visit www.oncare.org to determine if testing is needed, and where this is done.   If you have any questions, please contact your usual health care provider or the [place] Department of Health ([facility]) at [phone]    EMERGENCY INSTRUCTIONS  IF YOU NEED EMERGENCY MEDICAL ATTENTION, CALL 911 AND LET THEM KNOW YOU MAY HAVE ARE BEING EVALUATED FOR COVID-19.          WHAT IS COVID-19 (CORONAVIRUS)?  COVID-19 is a viral respiratory illness caused by a newly identified coronavirus that was discovered in late 2019 in [place]. Coronaviruses are a large family of viruses. Some coronaviruses cause illnesses in people and others only circulate among animals. Rarely, animal coronaviruses can evolve and infect people. The virus causing COVID-19 may have emerged from an animal source, and it is now able to spread from person to person.   How does it spread?   The virus is thought to spread between people who are in close contact (within about six feet) through respiratory droplets produced when an infected person coughs, sneezes, or talks. It may also spread when one person touches a surface or object that has the virus on it and then touches their mouth, nose, or eyes. However, this is not thought to be the main way the virus is transmitted.  SYMPTOMS  This coronavirus causes mild to severe  respiratory illness with often with fever, cough, and/or difficulty breathing. After exposure, symptoms typically present within 2 to 14 days.   TREATMENTS AND PREVENTION  Patients may also be asked to self-quarantine at home in order to prevent the spread of the coronavirus. There is no antiviral treatment recommended for COVID-19. People with COVID-19 may receive supportive care to help relieve symptoms.  Prevention  No vaccine is currently available for the coronavirus causing COVID-19. The best way to prevent spread of the illness is to avoid exposure through simple precautions. Prevention steps include:   Stay home if you're feeling sick.   Avoid close contact with others, and try to stay at least 6-feet away from others if you're ill.   Wash your hands often with soap and warm water for at least 20 seconds, especially after going to the bathroom; before eating; and after blowing your nose, coughing, or sneezing. Use an alcohol-based hand  with at least 60 percent alcohol if soap and water are not readily available.   Clean and disinfect frequently touched objects and surfaces using a regular household cleaning spray or wipe.   Thank you for your understanding and cooperation.

## 2020-03-12 NOTE — ED PROVIDER NOTES
ED Provider Note  [facility]      History     Chief Complaint   Patient presents with     Flu Symptoms     [facility]. Exposure to daughter, who had exposure to people from [facility] recently.     The history is provided by the patient.   Flu Symptoms   Presenting symptoms: cough    Presenting symptoms: no fever, no headaches, no myalgias and no sore throat    Cough   Cough characteristics:  Productive  Severity:  Mild  Onset quality:  Gradual  Duration:  4 weeks  Timing:  Intermittent  Progression:  Waxing and waning  Chronicity:  New  Smoker: no    Context: upper respiratory infection    Relieved by:  Nothing  Worsened by:  Nothing  Ineffective treatments:  None tried  Associated symptoms: no chest pain, no fever, no headaches, no myalgias, no rash and no sore throat      Silvia Brewster is a 54 year old female who is essentially here to be tested for coronavirus.  States daughter was in [employer], had students from [place] who had viral illnesses, was told to come here to be tested by her care provider.  Cough is been going on since 213, finished a 5-day course of azithromycin after she was diagnosed clinically with pneumonia, had no x-ray.  States her URI symptoms felt better for a few days then got worse, has progressively been persistent and patient woke up with some gurgly chest congestion this morning and felt compelled to come in to get tested.  Temperatures of 99.5, does not taking significant amount of Tylenol Motrin at home.  No sore throat, no runny nose, no ear pain, does have hot flashes but states she has had that well before this started.  Past Medical History  Past Medical History:   Diagnosis Date     Bicuspid aortic valve      Hypertension      Migraine headache      Pneumonia 02/2020     Past Surgical History:   Procedure Laterality Date     C/SECTION, LOW TRANSVERSE       ibuprofen (ADVIL/MOTRIN) 200 MG capsule  Saccharomyces boulardii (PROBIOTIC) 250 MG  CAPS  VITAMIN D, CHOLECALCIFEROL, PO  azithromycin (ZITHROMAX) 250 MG tablet  B Complex Vitamins (GNP VITAMIN B COMPLEX PO)  Multiple Vitamin (MULTI-VITAMIN) per tablet  Specialty Vitamins Products (LUNG) TABS      Allergies   Allergen Reactions     Penicillins Hives     Past medical history, past surgical history, medications, and allergies were reviewed with the patient. Additional pertinent items: None    Family History  Family History   Problem Relation Age of Onset     Breast Cancer Mother      Prostate Cancer Father      Family history was reviewed with the patient. Additional pertinent items: None    Social History  Social History     Tobacco Use     Smoking status: Former Smoker     Last attempt to quit: 1990     Years since quittin.3     Smokeless tobacco: Never Used   Substance Use Topics     Alcohol use: Never     Alcohol/week: 0.0 standard drinks     Frequency: Never     Drug use: Never      Social history was reviewed with the patient. Additional pertinent items: None    Review of Systems   Constitutional: Negative for fever.   HENT: Negative for sore throat.    Respiratory: Positive for cough.    Cardiovascular: Negative for chest pain.   Musculoskeletal: Negative for myalgias.   Skin: Negative for rash.   Neurological: Negative for headaches.   All other systems reviewed and are negative.    A complete review of systems was performed with pertinent positives and negatives noted in the HPI, and all other systems negative.    Physical Exam   BP: 117/69  Pulse: 68  Temp: 98.5  F (36.9  C)  Resp: 16  Height: (6'0 feet: reported ny pt)  Weight: (165 lbs: reported by pt)  SpO2: 98 %  Physical Exam  Vitals signs and nursing note reviewed.   Constitutional:       General: She is not in acute distress.     Appearance: She is not diaphoretic.   HENT:      Head: Atraumatic.      Mouth/Throat:      Pharynx: No oropharyngeal exudate.   Eyes:      General: No scleral icterus.     Pupils: Pupils are  equal, round, and reactive to light.   Cardiovascular:      Rate and Rhythm: Normal rate and regular rhythm.      Pulses: Normal pulses.      Heart sounds: Normal heart sounds.   Pulmonary:      Effort: No respiratory distress.      Breath sounds: Normal breath sounds.   Abdominal:      General: Bowel sounds are normal.      Palpations: Abdomen is soft.      Tenderness: There is no abdominal tenderness.   Musculoskeletal:         General: No tenderness.   Skin:     General: Skin is warm.      Findings: No rash.   Neurological:      General: No focal deficit present.      Mental Status: She is oriented to person, place, and time.         ED Course      Procedures                         Results for orders placed or performed during the hospital encounter of 03/11/20   XR Chest Port 1 View     Status: None    Narrative    CHEST ONE VIEW PORTABLE   3/11/2020 9:45 PM     HISTORY: Rule out corona virus, cough x 3 weeks with low fever.    COMPARISON: 6/23/2009       Impression    IMPRESSION: Normal single view of the chest.    ROB PATEL MD     Medications - No data to display     Assessments & Plan (with Medical Decision Making)     54-year-old female past medical surgical history as above here with cough symptoms for about a month.  Has significant exposure to possible coronavirus therefore will test.  Patient clinically stable.  Does not need admission for her symptoms.  Assuming x-ray without any acute abnormalities, will discharge patient to self isolate at home.    I have reviewed the nursing notes. I have reviewed the findings, diagnosis, plan and need for follow up with the patient.    New Prescriptions    No medications on file       Final diagnoses:   Cough       --  Carl Reese MD   Emergency Medicine   [facility], [place], EMERGENCY DEPARTMENT  3/11/2020     Carl Reese MD  03/11/20 [id number]

## 2020-03-12 NOTE — ED TRIAGE NOTES
Pt presents to ED with c/o cough, headache, and concerns for possible recurrence of pneumonia.  Her daughter was teaching in [employer] and had exposure to Covid-19. She returned to the [place] due to illness. She was never tested.  The pt and her spouse eventually got sick, diagnosed with pneumonia.   The pt only had 5 days of ABX. Initially she felt better: finished ABX on 2/25/20. Approximately 1 week later she started to feel sick again.  Pt reports headache, cough, and hot flashes with sweating: different from her post-menopausal hot flashes.  Sent in from [facility] for further eval.    Pt also has intermittent RUQ pain X 6 months: currently 6-7/10.

## 2020-03-12 NOTE — TELEPHONE ENCOUNTER
"Zoraida, an NP for [facility] contacting FNA line, as she is assessed Silvia and thinks that patient get a chest x-ray and evaluation for COVID-19, due to family travel/illness history.  Called patient at home to ask travel questions and arrange for patient to be seen. Patient also had a virtual visit this morning and was asked to be seen in person.    Silvia's daughter traveled to [facility] for the month of January, came back early around the 20th because she was ill.  Doctor diagnosed her with a \"virus\". Daughter was teaching students from all over, many from [place] and some that were ill.  Daughter was not tested for COVID-19 when she returned, as she had not traveled to [place].     Silvia's  has also diagnosed with pneumonia around 2/13 and Silvia diagnosed around 2/20/2020.       She took was seen and diagnosed with pneumonia on 2/20 and took antibiotic, felt better for a few days, and then started feeling worse again, which is why she was seen again today.    She did mention that when she is laying down, her lungs are making a crackling/popping sound.  She also reports temps of 99.5 and that she is dripping sweat, which is very abnormal for her, even with her hot flashes.    Facilitated patient being seen in [facility] [facility].  Called ahead and they instructed patient to park outside, call into the ER and they will come out and get her.    Patient verbalized understanding and would be proceeding to the ER.     Tammy Morataya RN on 3/11/2020 at 7:42 PM                      "

## 2020-03-12 NOTE — RESULT ENCOUNTER NOTE
Final Influenza A/B antigen is NEGATIVE for Influenza A and Influenza B    No treatment or change in treatment per [facility] Respiratory Virus Panel or Influenza A/B antigen protocol.

## 2020-03-12 NOTE — ED NOTES
Discharge instructions reviewed with pt. She is instructed to return home and self-quarantine until further instructions from MD.

## 2020-03-14 NOTE — ED NOTES
Telephone note: I was called by UZMA Loki Reggie on 3/14/2020 at 1425 pm to tell me that Ms Brewster ( tested for Covid by Dr. Cabrera on 311/2020) has tested positive for the corona virus.  I asked Mr. Paredes to please call the patient and inform her of this and what follow-up she will need to do.  He said he would do so.  He also tells me there is a mechanism in place to have the physician informed.    MD Mika Chaudhry Alda L, MD  03/14/20 [id number]

## 2020-03-15 ENCOUNTER — TELEPHONE (OUTPATIENT)
Dept: EMERGENCY MEDICINE | Facility: CLINIC | Age: 55
End: 2020-03-15

## 2020-03-17 ENCOUNTER — VIRTUAL VISIT (OUTPATIENT)
Dept: FAMILY MEDICINE | Facility: CLINIC | Age: 55
End: 2020-03-17
Payer: COMMERCIAL

## 2020-03-17 DIAGNOSIS — J18.9 PNEUMONIA OF LEFT LUNG DUE TO INFECTIOUS ORGANISM, UNSPECIFIED PART OF LUNG: Primary | ICD-10-CM

## 2020-03-17 PROCEDURE — 99207 ZZC NO BILLABLE SERVICE THIS VISIT: CPT | Performed by: NURSE PRACTITIONER

## 2020-03-17 NOTE — PROGRESS NOTES
"Silvia Brewster is a 54 year old female who is being evaluated via a billable telephone visit.      The patient has been notified of following:     \"This telephone visit will be conducted via a call between you and your physician/provider. We have found that certain health care needs can be provided without the need for a physical exam.  This service lets us provide the care you need with a short phone conversation.  If a prescription is necessary we can send it directly to your pharmacy.  If lab work is needed we can place an order for that and you can then stop by our lab to have the test done at a later time.    If during the course of the call the physician/provider feels a telephone visit is not appropriate, you will not be charged for this service.\"       Silvia Brewster complains of    Chief Complaint   Patient presents with     Pneumonia Follow up       I have reviewed and updated the patient's Past Medical History, Social History, Family History and Medication List.    ALLERGIES  Penicillins    Silvia Brewster has continued to have some shortness of breath and cough over the past several weeks. She has been self-quarantined for the past week, and has been feeling better. She has been able to move and pack boxes for her upcoming move. She has no fever. Her appetite is back to normal.    Assessment/Plan:  Covid-19    I have reviewed the note as documented above.  This accurately captures the substance of my conversation with the patient.    No Charge for patient- typical follow up for after-visit care   call time less than 5 minutes  ZANDER Jones CNP    "

## 2020-03-26 ENCOUNTER — TELEPHONE (OUTPATIENT)
Dept: EMERGENCY MEDICINE | Facility: CLINIC | Age: 55
End: 2020-03-26

## 2020-03-26 LAB
COVID-19 VIRUS PCR RESULT FROM MDH: NEGATIVE
COVID-19 VIRUS PCR RESULT FROM MDH: POSITIVE

## 2020-03-26 NOTE — TELEPHONE ENCOUNTER
Received result from lab that Silvia's oral specimen was positive for COVID-19.  Patient notified earlier in the day the the nasalpharyngeal result was negative    RN contacted MD about this issue.  MD provider line spokesperson, Catarino, stated that MD has contacted her this afternoon and notified her of the result and recommendations.    Catarino Lopez RN  [facility] [employer]  Emergency Dept Lab Result RN  Ph# [phone]

## 2020-03-26 NOTE — RESULT ENCOUNTER NOTE
Coronavirus (COVID-19) Notification    Patient notified of Negative COVID-19.    Patient can discontinue Quarantee and is free to resume normal activites.  If Patient has questions that you are not able to answer they can contact PCP or MD hotline ([phone])    Please Contact your PCP clinic or return to the Emergency department if your:  Symptoms worsen or other concerning symptom's.    Rox Ca LPN

## 2020-12-06 ENCOUNTER — HEALTH MAINTENANCE LETTER (OUTPATIENT)
Age: 55
End: 2020-12-06

## 2021-04-11 ENCOUNTER — HEALTH MAINTENANCE LETTER (OUTPATIENT)
Age: 56
End: 2021-04-11

## 2021-06-07 ENCOUNTER — RECORDS - HEALTHEAST (OUTPATIENT)
Dept: LAB | Facility: CLINIC | Age: 56
End: 2021-06-07

## 2021-09-25 ENCOUNTER — HEALTH MAINTENANCE LETTER (OUTPATIENT)
Age: 56
End: 2021-09-25

## 2021-11-16 ENCOUNTER — ANCILLARY PROCEDURE (OUTPATIENT)
Dept: CT IMAGING | Facility: CLINIC | Age: 56
End: 2021-11-16
Payer: COMMERCIAL

## 2021-11-16 DIAGNOSIS — R05.9 COUGH: ICD-10-CM

## 2021-11-16 LAB
CREAT BLD-MCNC: 0.9 MG/DL (ref 0.5–1)
GFR SERPL CREATININE-BSD FRML MDRD: >60 ML/MIN/1.73M2

## 2021-11-16 PROCEDURE — 71275 CT ANGIOGRAPHY CHEST: CPT | Mod: GC | Performed by: RADIOLOGY

## 2021-11-16 RX ORDER — IOPAMIDOL 755 MG/ML
59 INJECTION, SOLUTION INTRAVASCULAR ONCE
Status: COMPLETED | OUTPATIENT
Start: 2021-11-16 | End: 2021-11-16

## 2021-11-16 RX ADMIN — IOPAMIDOL 59 ML: 755 INJECTION, SOLUTION INTRAVASCULAR at 14:27

## 2022-03-12 ENCOUNTER — HEALTH MAINTENANCE LETTER (OUTPATIENT)
Age: 57
End: 2022-03-12

## 2022-05-07 ENCOUNTER — HEALTH MAINTENANCE LETTER (OUTPATIENT)
Age: 57
End: 2022-05-07

## 2022-10-25 ENCOUNTER — TRANSCRIBE ORDERS (OUTPATIENT)
Dept: OTHER | Age: 57
End: 2022-10-25

## 2022-10-25 DIAGNOSIS — Z95.2 S/P AVR (AORTIC VALVE REPLACEMENT): Primary | ICD-10-CM

## 2022-10-27 ENCOUNTER — TRANSCRIBE ORDERS (OUTPATIENT)
Dept: OTHER | Age: 57
End: 2022-10-27

## 2022-11-08 ENCOUNTER — TRANSCRIBE ORDERS (OUTPATIENT)
Dept: OTHER | Age: 57
End: 2022-11-08

## 2022-11-09 ENCOUNTER — HOSPITAL ENCOUNTER (OUTPATIENT)
Dept: CARDIAC REHAB | Facility: CLINIC | Age: 57
Discharge: HOME | End: 2022-11-09
Attending: INTERNAL MEDICINE
Payer: COMMERCIAL

## 2022-11-09 DIAGNOSIS — Z95.2 S/P AVR (AORTIC VALVE REPLACEMENT): ICD-10-CM

## 2022-11-09 PROCEDURE — 93797 PHYS/QHP OP CAR RHAB WO ECG: CPT

## 2022-11-09 PROCEDURE — 93798 PHYS/QHP OP CAR RHAB W/ECG: CPT

## 2022-11-11 ENCOUNTER — HOSPITAL ENCOUNTER (OUTPATIENT)
Dept: CARDIAC REHAB | Facility: CLINIC | Age: 57
Discharge: HOME | End: 2022-11-11
Attending: INTERNAL MEDICINE
Payer: COMMERCIAL

## 2022-11-11 PROCEDURE — 93798 PHYS/QHP OP CAR RHAB W/ECG: CPT

## 2023-06-02 ENCOUNTER — HEALTH MAINTENANCE LETTER (OUTPATIENT)
Age: 58
End: 2023-06-02

## 2024-04-14 ENCOUNTER — HEALTH MAINTENANCE LETTER (OUTPATIENT)
Age: 59
End: 2024-04-14

## 2024-06-23 ENCOUNTER — HEALTH MAINTENANCE LETTER (OUTPATIENT)
Age: 59
End: 2024-06-23

## 2025-07-12 ENCOUNTER — HEALTH MAINTENANCE LETTER (OUTPATIENT)
Age: 60
End: 2025-07-12